# Patient Record
Sex: FEMALE | Race: WHITE | NOT HISPANIC OR LATINO | Employment: UNEMPLOYED | URBAN - NONMETROPOLITAN AREA
[De-identification: names, ages, dates, MRNs, and addresses within clinical notes are randomized per-mention and may not be internally consistent; named-entity substitution may affect disease eponyms.]

---

## 2019-08-28 ENCOUNTER — OFFICE VISIT (OUTPATIENT)
Dept: CARDIOLOGY | Facility: CLINIC | Age: 39
End: 2019-08-28

## 2019-08-28 VITALS
HEIGHT: 65 IN | DIASTOLIC BLOOD PRESSURE: 64 MMHG | HEART RATE: 94 BPM | SYSTOLIC BLOOD PRESSURE: 110 MMHG | BODY MASS INDEX: 37.89 KG/M2 | WEIGHT: 227.4 LBS

## 2019-08-28 DIAGNOSIS — R01.1 MURMUR, CARDIAC: ICD-10-CM

## 2019-08-28 DIAGNOSIS — R00.2 PALPITATIONS: ICD-10-CM

## 2019-08-28 DIAGNOSIS — E88.81 METABOLIC SYNDROME: ICD-10-CM

## 2019-08-28 DIAGNOSIS — E11.9 TYPE 2 DIABETES MELLITUS WITHOUT COMPLICATION, WITHOUT LONG-TERM CURRENT USE OF INSULIN (HCC): ICD-10-CM

## 2019-08-28 DIAGNOSIS — R07.89 CHEST PAIN, ATYPICAL: ICD-10-CM

## 2019-08-28 DIAGNOSIS — R00.0 TACHYCARDIA: Primary | ICD-10-CM

## 2019-08-28 DIAGNOSIS — E05.90 HYPERTHYROIDISM: ICD-10-CM

## 2019-08-28 PROBLEM — E88.810 METABOLIC SYNDROME: Status: ACTIVE | Noted: 2019-08-28

## 2019-08-28 PROCEDURE — 99244 OFF/OP CNSLTJ NEW/EST MOD 40: CPT | Performed by: INTERNAL MEDICINE

## 2019-08-28 PROCEDURE — 93000 ELECTROCARDIOGRAM COMPLETE: CPT | Performed by: INTERNAL MEDICINE

## 2019-08-28 RX ORDER — METFORMIN HYDROCHLORIDE 500 MG/1
500 TABLET, EXTENDED RELEASE ORAL 4 TIMES DAILY
COMMUNITY

## 2019-08-28 RX ORDER — NADOLOL 40 MG/1
40 TABLET ORAL DAILY
Qty: 30 TABLET | Refills: 5 | Status: SHIPPED | OUTPATIENT
Start: 2019-08-28 | End: 2019-11-01 | Stop reason: SDUPTHER

## 2019-08-28 RX ORDER — GLIPIZIDE 10 MG/1
10 TABLET, FILM COATED, EXTENDED RELEASE ORAL DAILY
COMMUNITY
End: 2020-11-19

## 2019-08-28 NOTE — PROGRESS NOTES
Chief Complaint   Patient presents with   • Establish Care     patient is in office today d/t tachycardia   , Says she has always had an increased heart rate. .has most recent labs with her .. will follow with Endocronologist in about a month . Follow with PCP  tomorrow 8-29-19   • Chest Pain     Has mild pain to left shoulder, which comes and goes this has been on going  .   • Shortness of Breath     Has had URI , received two rounds of antibiotics recently . Has dry cough with no relief .   • Med Refill     Had medication bottles with her today        CARDIAC COMPLAINTS  chest pressure/discomfort, dyspnea and palpitations      Subjective   Kelsey Taylor is a 39 y.o. female for her initial cardiac evaluation.  She has history of diabetes and also history of abnormal thyroid problem for many years.  In 2015 she was noted to have abnormal thyroid function tests and apparently had a ultrasound done which showed small foci in the thyroid gland and was advised to undergo T-123 scan.  For some reason it was not done.  She was seen by an endocrinologist who told her it was only borderline and nothing needs to be done.  She has been having episodes of tachycardia for a long time but in the last few months is been getting worse.  Her heart rate normally runs between 9210 for the last 10 years but recently when she had a sinus problem it apparently has gone up to 150.  She also started noticing chest pain in the form of sharp pain in the left part of the chest near the left shoulder which comes and goes anytime of the day lasting for few minutes and subsides spontaneously.  She also has been having some shortness of breath apparently she was diagnosed with a URI and had 2 course of antibiotic but she still continues to have it she still has some dry cough with no relief.  She did undergo lab work recently and found the TSH was low at 0.135 and T4 level was elevated at 13.7.  She used to be on a low-dose of beta-blockers in  the form of metoprolol 25 mg twice daily but apparently the heart rate is still persisting [place].  She is not a smoker she drinks occasional alcohol.  Her father was diagnosed with ischemic heart disease, heart failure as well as atrial fibrillation.  She is not sure whether her father had thyroid problem.      No past surgical history on file.    Current Outpatient Medications   Medication Sig Dispense Refill   • glipiZIDE (GLUCOTROL XL) 10 MG 24 hr tablet Take 10 mg by mouth Daily. Takes 2 tablets daily     • metFORMIN ER (GLUCOPHAGE-XR) 500 MG 24 hr tablet Take 500 mg by mouth 4 (Four) Times a Day.     • Semaglutide (OZEMPIC) 0.25 or 0.5 MG/DOSE solution pen-injector Inject 0.5 mg under the skin into the appropriate area as directed 1 (One) Time Per Week.     • nadolol (CORGARD) 40 MG tablet Take 1 tablet by mouth Daily. 30 tablet 5     No current facility-administered medications for this visit.            ALLERGIES:  Patient has no allergy information on record.    Past Medical History:   Diagnosis Date   • Diabetes mellitus (CMS/HCC)    • H/O tubal ligation    • History of endometrial ablation    • Hx of cholecystectomy        Social History     Tobacco Use   Smoking Status Never Smoker   Smokeless Tobacco Never Used          Family History   Problem Relation Age of Onset   • Lung cancer Mother    • Heart failure Father    • Arrhythmia Father    • Atrial fibrillation Father    • Heart attack Father    • Hyperlipidemia Father    • Hypertension Father    • Diabetes type II Father    • Diabetes type II Sister    • Cancer Paternal Grandmother        Review of Systems   Constitution: Positive for malaise/fatigue and weight loss. Negative for decreased appetite.   HENT: Negative for congestion and sore throat.    Eyes: Negative for blurred vision.   Cardiovascular: Positive for chest pain, dyspnea on exertion and palpitations.   Respiratory: Positive for shortness of breath. Negative for snoring.    Endocrine:  "Negative for cold intolerance and heat intolerance.   Hematologic/Lymphatic: Negative for adenopathy. Does not bruise/bleed easily.   Skin: Negative for itching, nail changes and skin cancer.   Musculoskeletal: Negative for arthritis and myalgias.   Gastrointestinal: Negative for abdominal pain, dysphagia and heartburn.   Genitourinary: Negative for bladder incontinence and frequency.   Neurological: Negative for dizziness, light-headedness, seizures and vertigo.   Psychiatric/Behavioral: Negative for altered mental status.   Allergic/Immunologic: Negative for environmental allergies and hives.       Diabetes- Yes  Thyroid- abnormal    Objective     /64 (BP Location: Left arm)   Pulse 94   Ht 165.1 cm (65\")   Wt 103 kg (227 lb 6.4 oz)   BMI 37.84 kg/m²     Physical Exam   Constitutional: She is oriented to person, place, and time. She appears well-developed and well-nourished.   HENT:   Head: Normocephalic.   Nose: Nose normal.   Eyes: EOM are normal. Pupils are equal, round, and reactive to light.   Neck: Normal range of motion. Neck supple.   Cardiovascular: Normal rate, regular rhythm, S1 normal and S2 normal.   Murmur heard.  Pulmonary/Chest: Breath sounds normal.   Abdominal: Soft. Bowel sounds are normal.   Musculoskeletal: Normal range of motion. She exhibits no edema.   Neurological: She is alert and oriented to person, place, and time.   Skin: Skin is warm and dry.   Psychiatric: She has a normal mood and affect.         ECG 12 Lead  Date/Time: 8/28/2019 3:37 PM  Performed by: Brenda Hutton MD  Authorized by: Brenda Hutton MD   Previous ECG: no previous ECG available  Rhythm: sinus rhythm  Rate: normal  QRS axis: normal  Other findings: non-specific ST-T wave changes    Clinical impression: non-specific ECG              Assessment/Plan   Patient's Body mass index is 37.84 kg/m². BMI is above normal parameters. Recommendations include: educational material, exercise counseling and " nutrition counseling.     Kelsey was seen today for establish care, chest pain, shortness of breath and med refill.    Diagnoses and all orders for this visit:    Tachycardia  -     nadolol (CORGARD) 40 MG tablet; Take 1 tablet by mouth Daily.  -     Stress Test With Myocardial Perfusion One Day; Future    Hyperthyroidism    Type 2 diabetes mellitus without complication, without long-term current use of insulin (CMS/[facility])  -     Lipid Panel; Future    Palpitations  -     nadolol (CORGARD) 40 MG tablet; Take 1 tablet by mouth Daily.    Metabolic syndrome  -     Adult Transthoracic Echo Complete W/ Cont if Necessary Per Protocol; Future  -     Lipid Panel; Future    Chest pain, atypical  -     Stress Test With Myocardial Perfusion One Day; Future  -     Lipid Panel; Future  -     High Sensitivity CRP; Future    Murmur, cardiac  -     Adult Transthoracic Echo Complete W/ Cont if Necessary Per Protocol; Future    At baseline her heart rate is upper limit of normal.  Her blood pressure is stable.  Her EKG showed sinus rhythm with diffuse nonspecific ST-T changes.  Her clinical examination reveals a BMI of 38.  She does have slightly loud second heart sound and a short systolic murmur at the mitral area.  She does have trace pedal edema.  I had a long talk with her about the abnormal thyroid function test.  At this time am changing the Lopressor to Corgard.  She will be able to take the medication more regularly.  Apparently she does misses her evening dose of Lopressor multiple times.  I also advised her to undergo lab work to check her lipids, high CRP level.  I scheduled her to undergo an echocardiogram to evaluate the LV function, valvular structures and also to rule out any pericardial effusion.  I also schedule her to undergo a stress test to evaluate her functional status, chronotropic response and also look for any stress-induced ischemia or arrhythmia.  If the cardiac evaluation is completely normal then she may  need to undergo a few more investigation for her thyroid including the nuclear scan.  Based on the results of the test and the response to her medication, further recommendations will be made.               Electronically signed by Brenda Hutton MD August 28, 2019 3:31 PM

## 2019-08-28 NOTE — PATIENT INSTRUCTIONS
Mediterranean Diet  A Mediterranean diet refers to food and lifestyle choices that are based on the traditions of countries located on the [place]. This way of eating has been shown to help prevent certain conditions and improve outcomes for people who have chronic diseases, like kidney disease and heart disease.  What are tips for following this plan?  Lifestyle  · Cook and eat meals together with your family, when possible.  · Drink enough fluid to keep your urine clear or pale yellow.  · Be physically active every day. This includes:  ? Aerobic exercise like running or swimming.  ? Leisure activities like gardening, walking, or housework.  · Get 7-8 hours of sleep each night.  · If recommended by your health care provider, drink red wine in moderation. This means 1 glass a day for nonpregnant women and 2 glasses a day for men. A glass of wine equals 5 oz (150 mL).  Reading food labels    · Check the serving size of packaged foods. For foods such as rice and pasta, the serving size refers to the amount of cooked product, not dry.  · Check the total fat in packaged foods. Avoid foods that have saturated fat or trans fats.  · Check the ingredients list for added sugars, such as corn syrup.  Shopping  · At the grocery store, buy most of your food from the areas near the walls of the store. This includes:  ? Fresh fruits and vegetables (produce).  ? Grains, beans, nuts, and seeds. Some of these may be available in unpackaged forms or large amounts (in bulk).  ? Fresh seafood.  ? Poultry and eggs.  ? Low-fat dairy products.  · Buy whole ingredients instead of prepackaged foods.  · Buy fresh fruits and vegetables in-season from local farmers markets.  · Buy frozen fruits and vegetables in resealable bags.  · If you do not have access to quality fresh seafood, buy precooked frozen shrimp or canned fish, such as tuna, salmon, or sardines.  · Buy small amounts of raw or cooked vegetables, salads, or olives from  the deli or salad bar at your store.  · Stock your pantry so you always have certain foods on hand, such as olive oil, canned tuna, canned tomatoes, rice, pasta, and beans.  Cooking  · Cook foods with extra-virgin olive oil instead of using butter or other vegetable oils.  · Have meat as a side dish, and have vegetables or grains as your main dish. This means having meat in small portions or adding small amounts of meat to foods like pasta or stew.  · Use beans or vegetables instead of meat in common dishes like chili or lasagna.  · [place] with different cooking methods. Try roasting or broiling vegetables instead of steaming or sautéeing them.  · Add frozen vegetables to soups, stews, pasta, or rice.  · Add nuts or seeds for added healthy fat at each meal. You can add these to yogurt, salads, or vegetable dishes.  · Marinate fish or vegetables using olive oil, lemon juice, garlic, and fresh herbs.  Meal planning    · Plan to eat 1 vegetarian meal one day each week. Try to work up to 2 vegetarian meals, if possible.  · Eat seafood 2 or more times a week.  · Have healthy snacks readily available, such as:  ? Vegetable sticks with hummus.  ? Greek yogurt.  ? Fruit and nut trail mix.  · Eat balanced meals throughout the week. This includes:  ? Fruit: 2-3 servings a day  ? Vegetables: 4-5 servings a day  ? Low-fat dairy: 2 servings a day  ? Fish, poultry, or lean meat: 1 serving a day  ? Beans and legumes: 2 or more servings a week  ? Nuts and seeds: 1-2 servings a day  ? Whole grains: 6-8 servings a day  ? Extra-virgin olive oil: 3-4 servings a day  · Limit red meat and sweets to only a few servings a month  What are my food choices?  · Mediterranean diet  ? Recommended  ? Grains: Whole-grain pasta. Brown rice. Bulgar wheat. Polenta. Couscous. Whole-wheat bread. Oatmeal. Quinoa.  ? Vegetables: Artichokes. Beets. Broccoli. Cabbage. Carrots. Eggplant. Green beans. Chard. Kale. Spinach. Onions. Leeks. Peas. Squash.  Tomatoes. Peppers. Radishes.  ? Fruits: Apples. Apricots. Avocado. Berries. Bananas. Cherries. Dates. Figs. Grapes. [place]. Melon. Oranges. Peaches. Plums. Pomegranate.  ? Meats and other protein foods: Beans. Almonds. Sunflower seeds. Pine nuts. Peanuts. Cod. [place]. Scallops. Shrimp. Tuna. Tilapia. Clams. Oysters. Eggs.  ? Dairy: Low-fat milk. Cheese. Greek yogurt.  ? Beverages: Water. Red wine. Herbal tea.  ? Fats and oils: Extra virgin olive oil. Avocado oil. Grape seed oil.  ? Sweets and desserts: Greek yogurt with honey. Baked apples. Poached pears. Trail mix.  ? Seasoning and other foods: Basil. Cilantro. Coriander. Cumin. Mint. Parsley. [place]. Rosemary. Tarragon. Garlic. Oregano. Thyme. Pepper. Balsalmic vinegar. Tahini. Hummus. Tomato sauce. Olives. Mushrooms.  ? Limit these  ? Grains: Prepackaged pasta or rice dishes. Prepackaged cereal with added sugar.  ? Vegetables: Deep fried potatoes (french fries).  ? Fruits: Fruit canned in syrup.  ? Meats and other protein foods: Beef. Pork. Lamb. Poultry with skin. Hot dogs. [place].  ? Dairy: Ice cream. Sour cream. Whole milk.  ? Beverages: Juice. Sugar-sweetened soft drinks. Beer. Liquor and spirits.  ? Fats and oils: Butter. Canola oil. Vegetable oil. Beef fat (tallow). Lard.  ? Sweets and desserts: Cookies. Cakes. Pies. Candy.  ? Seasoning and other foods: Mayonnaise. Premade sauces and marinades.  ? The items listed may not be a complete list. Talk with your dietitian about what dietary choices are right for you.  Summary  · The Mediterranean diet includes both food and lifestyle choices.  · Eat a variety of fresh fruits and vegetables, beans, nuts, seeds, and whole grains.  · Limit the amount of red meat and sweets that you eat.  · Talk with your health care provider about whether it is safe for you to drink red wine in moderation. This means 1 glass a day for nonpregnant women and 2 glasses a day for men. A glass of wine equals 5 oz (150 mL).  This information  is not intended to replace advice given to you by your health care provider. Make sure you discuss any questions you have with your health care provider.  Document Released: 08/10/2017 Document Revised: 09/12/2017 Document Reviewed: 08/10/2017  [employer] Interactive Patient Education © 2019 [employer]

## 2019-09-11 ENCOUNTER — HOSPITAL ENCOUNTER (OUTPATIENT)
Dept: CARDIOLOGY | Facility: HOSPITAL | Age: 39
Discharge: HOME | End: 2019-09-11

## 2019-09-11 ENCOUNTER — LAB (OUTPATIENT)
Dept: LAB | Facility: HOSPITAL | Age: 39
End: 2019-09-11

## 2019-09-11 VITALS — WEIGHT: 227.07 LBS | BODY MASS INDEX: 37.83 KG/M2 | HEIGHT: 65 IN

## 2019-09-11 DIAGNOSIS — R01.1 MURMUR, CARDIAC: ICD-10-CM

## 2019-09-11 DIAGNOSIS — E88.81 METABOLIC SYNDROME: ICD-10-CM

## 2019-09-11 DIAGNOSIS — E11.9 TYPE 2 DIABETES MELLITUS WITHOUT COMPLICATION, WITHOUT LONG-TERM CURRENT USE OF INSULIN (HCC): ICD-10-CM

## 2019-09-11 DIAGNOSIS — R07.89 CHEST PAIN, ATYPICAL: ICD-10-CM

## 2019-09-11 DIAGNOSIS — R00.0 TACHYCARDIA: ICD-10-CM

## 2019-09-11 LAB
BH CV ECHO MEAS - ACS: 2.2 CM
BH CV ECHO MEAS - AO MAX PG (FULL): 0.63 MMHG
BH CV ECHO MEAS - AO MAX PG: 4 MMHG
BH CV ECHO MEAS - AO MEAN PG (FULL): 0.18 MMHG
BH CV ECHO MEAS - AO MEAN PG: 2 MMHG
BH CV ECHO MEAS - AO ROOT AREA (BSA CORRECTED): 1.3
BH CV ECHO MEAS - AO ROOT AREA: 6.1 CM^2
BH CV ECHO MEAS - AO ROOT DIAM: 2.8 CM
BH CV ECHO MEAS - AO V2 MAX: 99.9 CM/SEC
BH CV ECHO MEAS - AO V2 MEAN: 65.1 CM/SEC
BH CV ECHO MEAS - AO V2 VTI: 19.8 CM
BH CV ECHO MEAS - BSA(HAYCOCK): 2.2 M^2
BH CV ECHO MEAS - BSA: 2.1 M^2
BH CV ECHO MEAS - BZI_BMI: 37.8 KILOGRAMS/M^2
BH CV ECHO MEAS - BZI_METRIC_HEIGHT: 165.1 CM
BH CV ECHO MEAS - BZI_METRIC_WEIGHT: 103 KG
BH CV ECHO MEAS - CI(CUBED): 4.8 L/MIN/M^2
BH CV ECHO MEAS - CI(TEICH): 4.7 L/MIN/M^2
BH CV ECHO MEAS - CO(CUBED): 10 L/MIN
BH CV ECHO MEAS - CO(TEICH): 9.8 L/MIN
BH CV ECHO MEAS - EDV(CUBED): 68 ML
BH CV ECHO MEAS - EDV(TEICH): 73.4 ML
BH CV ECHO MEAS - EF(CUBED): 77.4 %
BH CV ECHO MEAS - EF(TEICH): 70 %
BH CV ECHO MEAS - ESV(CUBED): 15.4 ML
BH CV ECHO MEAS - ESV(TEICH): 22 ML
BH CV ECHO MEAS - FS: 39.1 %
BH CV ECHO MEAS - IVS/LVPW: 0.97
BH CV ECHO MEAS - IVSD: 0.9 CM
BH CV ECHO MEAS - LA DIMENSION(2D): 3.6 CM
BH CV ECHO MEAS - LA DIMENSION: 4.1 CM
BH CV ECHO MEAS - LA/AO: 1.5
BH CV ECHO MEAS - LAT PEAK E' VEL: 10 CM/SEC
BH CV ECHO MEAS - LV IVRT: 0.1 SEC
BH CV ECHO MEAS - LV MASS(C)D: 116.7 GRAMS
BH CV ECHO MEAS - LV MASS(C)DI: 55.9 GRAMS/M^2
BH CV ECHO MEAS - LV MAX PG: 3.4 MMHG
BH CV ECHO MEAS - LV MEAN PG: 1.8 MMHG
BH CV ECHO MEAS - LV V1 MAX: 91.7 CM/SEC
BH CV ECHO MEAS - LV V1 MEAN: 63.6 CM/SEC
BH CV ECHO MEAS - LV V1 VTI: 20 CM
BH CV ECHO MEAS - LVIDD: 4.1 CM
BH CV ECHO MEAS - LVIDS: 2.5 CM
BH CV ECHO MEAS - LVPWD: 0.94 CM
BH CV ECHO MEAS - MED PEAK E' VEL: 8 CM/SEC
BH CV ECHO MEAS - MITRAL HR: 175.2 BPM
BH CV ECHO MEAS - MITRAL R-R: 0.34 SEC
BH CV ECHO MEAS - MM HR: 190.5 BPM
BH CV ECHO MEAS - MM R-R INT: 0.31 SEC
BH CV ECHO MEAS - MV A MAX VEL: 80.3 CM/SEC
BH CV ECHO MEAS - MV DEC SLOPE: 537 CM/SEC^2
BH CV ECHO MEAS - MV DEC TIME: 0.13 SEC
BH CV ECHO MEAS - MV E MAX VEL: 70.7 CM/SEC
BH CV ECHO MEAS - MV E/A: 0.88
BH CV ECHO MEAS - MV MAX PG: 3.9 MMHG
BH CV ECHO MEAS - MV MEAN PG: 1.9 MMHG
BH CV ECHO MEAS - MV V2 MAX: 98.5 CM/SEC
BH CV ECHO MEAS - MV V2 MEAN: 64.7 CM/SEC
BH CV ECHO MEAS - MV V2 VTI: 22.2 CM
BH CV ECHO MEAS - PA MAX PG (FULL): 3.3 MMHG
BH CV ECHO MEAS - PA MAX PG: 7.6 MMHG
BH CV ECHO MEAS - PA MEAN PG (FULL): 1.4 MMHG
BH CV ECHO MEAS - PA MEAN PG: 3.6 MMHG
BH CV ECHO MEAS - PA V2 MAX: 137.6 CM/SEC
BH CV ECHO MEAS - PA V2 MEAN: 87.1 CM/SEC
BH CV ECHO MEAS - PA V2 VTI: 31.4 CM
BH CV ECHO MEAS - PULM. HR: 166.7 BPM
BH CV ECHO MEAS - PULM. R-R: 0.36 SEC
BH CV ECHO MEAS - RAP SYSTOLE: 10 MMHG
BH CV ECHO MEAS - RV MAX PG: 4.3 MMHG
BH CV ECHO MEAS - RV MEAN PG: 2.2 MMHG
BH CV ECHO MEAS - RV V1 MAX: 103.8 CM/SEC
BH CV ECHO MEAS - RV V1 MEAN: 69.6 CM/SEC
BH CV ECHO MEAS - RV V1 VTI: 24.8 CM
BH CV ECHO MEAS - RVDD: 2.9 CM
BH CV ECHO MEAS - RVSP: 21 MMHG
BH CV ECHO MEAS - SI(AO): 58.1 ML/M^2
BH CV ECHO MEAS - SI(CUBED): 25.2 ML/M^2
BH CV ECHO MEAS - SI(TEICH): 24.6 ML/M^2
BH CV ECHO MEAS - SV(AO): 121.2 ML
BH CV ECHO MEAS - SV(CUBED): 52.6 ML
BH CV ECHO MEAS - SV(TEICH): 51.4 ML
BH CV ECHO MEAS - TR MAX VEL: 164.2 CM/SEC
BH CV ECHO MEASUREMENTS AVERAGE E/E' RATIO: 7.86
BH CV STRESS RECOVERY BP: NORMAL MMHG
BH CV STRESS RECOVERY HR: 97 BPM
CHOLEST SERPL-MCNC: 157 MG/DL (ref 0–200)
HDLC SERPL-MCNC: 52 MG/DL (ref 40–60)
LDLC SERPL CALC-MCNC: 82 MG/DL (ref 0–100)
LDLC/HDLC SERPL: 1.58 {RATIO}
LV EF NUC BP: 57 %
MAXIMAL PREDICTED HEART RATE: 180 BPM
MAXIMAL PREDICTED HEART RATE: 180 BPM
PERCENT MAX PREDICTED HR: 86.11 %
STRESS BASELINE BP: NORMAL MMHG
STRESS BASELINE HR: 83 BPM
STRESS PERCENT HR: 101 %
STRESS POST ESTIMATED WORKLOAD: 8.8 METS
STRESS POST EXERCISE DUR MIN: 7 MIN
STRESS POST EXERCISE DUR SEC: 10 SEC
STRESS POST PEAK BP: NORMAL MMHG
STRESS POST PEAK HR: 155 BPM
STRESS TARGET HR: 153 BPM
STRESS TARGET HR: 153 BPM
TRIGL SERPL-MCNC: 113 MG/DL (ref 0–150)
VLDLC SERPL-MCNC: 22.6 MG/DL

## 2019-09-11 PROCEDURE — 93018 CV STRESS TEST I&R ONLY: CPT | Performed by: INTERNAL MEDICINE

## 2019-09-11 PROCEDURE — 36415 COLL VENOUS BLD VENIPUNCTURE: CPT

## 2019-09-11 PROCEDURE — 0 TECHNETIUM SESTAMIBI: Performed by: INTERNAL MEDICINE

## 2019-09-11 PROCEDURE — A9500 TC99M SESTAMIBI: HCPCS | Performed by: INTERNAL MEDICINE

## 2019-09-11 PROCEDURE — 93017 CV STRESS TEST TRACING ONLY: CPT

## 2019-09-11 PROCEDURE — 93306 TTE W/DOPPLER COMPLETE: CPT | Performed by: INTERNAL MEDICINE

## 2019-09-11 PROCEDURE — 78452 HT MUSCLE IMAGE SPECT MULT: CPT

## 2019-09-11 PROCEDURE — 78452 HT MUSCLE IMAGE SPECT MULT: CPT | Performed by: INTERNAL MEDICINE

## 2019-09-11 PROCEDURE — 80061 LIPID PANEL: CPT | Performed by: INTERNAL MEDICINE

## 2019-09-11 PROCEDURE — 93306 TTE W/DOPPLER COMPLETE: CPT

## 2019-09-11 PROCEDURE — 86141 C-REACTIVE PROTEIN HS: CPT | Performed by: INTERNAL MEDICINE

## 2019-09-11 RX ADMIN — TECHNETIUM TC 99M SESTAMIBI 1 DOSE: 1 INJECTION INTRAVENOUS at 09:00

## 2019-09-11 RX ADMIN — TECHNETIUM TC 99M SESTAMIBI 1 DOSE: 1 INJECTION INTRAVENOUS at 09:01

## 2019-09-12 LAB — CRP SERPL-MCNC: 1.36 MG/DL (ref 0.01–0.5)

## 2019-10-07 ENCOUNTER — TELEPHONE (OUTPATIENT)
Dept: CARDIOLOGY | Facility: CLINIC | Age: 39
End: 2019-10-07

## 2019-10-07 NOTE — TELEPHONE ENCOUNTER
Received fax from Dr. Chilel for cardiac clearance for patient to have an EGD and a colonoscopy. According to our records, I do not see where patient has had any stenting or is on any blood thinners.       Fax [phone]

## 2019-11-01 ENCOUNTER — TELEPHONE (OUTPATIENT)
Dept: CARDIOLOGY | Facility: CLINIC | Age: 39
End: 2019-11-01

## 2019-11-01 DIAGNOSIS — R00.0 TACHYCARDIA: ICD-10-CM

## 2019-11-01 DIAGNOSIS — R00.2 PALPITATIONS: ICD-10-CM

## 2019-11-01 RX ORDER — NADOLOL 40 MG/1
40 TABLET ORAL DAILY
Qty: 30 TABLET | Refills: 5 | Status: SHIPPED | OUTPATIENT
Start: 2019-11-01 | End: 2020-03-27

## 2019-11-01 NOTE — TELEPHONE ENCOUNTER
Pt called asking for refills on nadolol 40 mg daily,#30.Sent to [facility]. art had not transferred the script.

## 2019-11-21 ENCOUNTER — OFFICE VISIT (OUTPATIENT)
Dept: CARDIOLOGY | Facility: CLINIC | Age: 39
End: 2019-11-21

## 2019-11-21 VITALS
WEIGHT: 237.4 LBS | DIASTOLIC BLOOD PRESSURE: 70 MMHG | HEART RATE: 76 BPM | SYSTOLIC BLOOD PRESSURE: 130 MMHG | HEIGHT: 65 IN | BODY MASS INDEX: 39.55 KG/M2

## 2019-11-21 DIAGNOSIS — R01.1 CARDIAC MURMUR: ICD-10-CM

## 2019-11-21 DIAGNOSIS — E05.00 GRAVES DISEASE: ICD-10-CM

## 2019-11-21 DIAGNOSIS — E11.9 TYPE 2 DIABETES MELLITUS WITHOUT COMPLICATION, WITHOUT LONG-TERM CURRENT USE OF INSULIN (HCC): ICD-10-CM

## 2019-11-21 DIAGNOSIS — E88.81 METABOLIC SYNDROME: Primary | ICD-10-CM

## 2019-11-21 DIAGNOSIS — R00.2 PALPITATIONS: ICD-10-CM

## 2019-11-21 DIAGNOSIS — I34.0 MITRAL VALVE INSUFFICIENCY, UNSPECIFIED ETIOLOGY: ICD-10-CM

## 2019-11-21 DIAGNOSIS — E66.09 CLASS 2 OBESITY DUE TO EXCESS CALORIES WITHOUT SERIOUS COMORBIDITY WITH BODY MASS INDEX (BMI) OF 39.0 TO 39.9 IN ADULT: ICD-10-CM

## 2019-11-21 DIAGNOSIS — Z79.899 MEDICATION MANAGEMENT: ICD-10-CM

## 2019-11-21 PROCEDURE — 99213 OFFICE O/P EST LOW 20 MIN: CPT | Performed by: NURSE PRACTITIONER

## 2019-11-21 RX ORDER — METHIMAZOLE 10 MG/1
10 TABLET ORAL DAILY
COMMUNITY
End: 2020-11-19

## 2020-03-27 DIAGNOSIS — R00.2 PALPITATIONS: ICD-10-CM

## 2020-03-27 DIAGNOSIS — R00.0 TACHYCARDIA: ICD-10-CM

## 2020-03-27 RX ORDER — NADOLOL 40 MG/1
TABLET ORAL
Qty: 30 TABLET | Refills: 8 | Status: SHIPPED | OUTPATIENT
Start: 2020-03-27 | End: 2020-11-19 | Stop reason: SDUPTHER

## 2020-11-19 ENCOUNTER — OFFICE VISIT (OUTPATIENT)
Dept: CARDIOLOGY | Facility: CLINIC | Age: 40
End: 2020-11-19

## 2020-11-19 VITALS
WEIGHT: 236.2 LBS | DIASTOLIC BLOOD PRESSURE: 80 MMHG | BODY MASS INDEX: 39.35 KG/M2 | SYSTOLIC BLOOD PRESSURE: 120 MMHG | HEIGHT: 65 IN | TEMPERATURE: 97.4 F | HEART RATE: 84 BPM

## 2020-11-19 DIAGNOSIS — R00.2 PALPITATIONS: ICD-10-CM

## 2020-11-19 DIAGNOSIS — R00.0 TACHYCARDIA: ICD-10-CM

## 2020-11-19 PROCEDURE — 99213 OFFICE O/P EST LOW 20 MIN: CPT | Performed by: NURSE PRACTITIONER

## 2020-11-19 RX ORDER — NADOLOL 40 MG/1
40 TABLET ORAL DAILY
Qty: 90 TABLET | Refills: 4 | Status: SHIPPED | OUTPATIENT
Start: 2020-11-19 | End: 2021-11-18 | Stop reason: SDUPTHER

## 2020-11-19 NOTE — PROGRESS NOTES
Chief Complaint   Patient presents with   • Follow-up     Cardiac management. Has been Dx with Graves disease.   • Lab     Last labs July or August per Ann Castillo.   • Dizziness     Feels related to low B/P at times.   • Med Refill     Needs refills on Nadolol-90 day.     Subjective       Kelsey Taylor is a 40 y.o. female with diabetes who was referred in August 2019 for cardiac evaluation of tachycardia and chest pain. In 2015 she was found to have abnormal thyroid function which was followed by endocrinology until she was diagnosed with Graves disease in 2019 managed with Tapazole. Because of the cardiac symptoms, she underwent stress test and echocardiogram which showed mild hypertensive response, normal chronotropic response and no ischemia. Echo revealed normal LVEF with mild MR. Lopressor was changed to nadolol and symptoms improved.     She returns today for follow up. She is tolerating nadolol with improvement of tachycardia and chest discomfort. She does occasionally have light-headedness with lower blood pressure. No syncope. She follows with endocrinology in [facility] who manages diabetes and hyperthyroidism. Thyroid is now stable according to her, but A1C remains difficult to control, around 9%.        Cardiac History:    Past Surgical History:   Procedure Laterality Date   • CARDIOVASCULAR STRESS TEST  09/11/2019    7 Min, 10 Secs.8.80 METS. 86% THR. BP- 150/104. EF 57%. Negative.   • ECHO - CONVERTED  09/11/2019    EF 65%. LA- 4.1 Cm. Mild MR. RVSP- 21 mmHg.     Current Outpatient Medications   Medication Sig Dispense Refill   • Liraglutide (VICTOZA SC) Inject  under the skin into the appropriate area as directed Daily.     • metFORMIN ER (GLUCOPHAGE-XR) 500 MG 24 hr tablet Take 500 mg by mouth 4 (Four) Times a Day.     • nadolol (CORGARD) 40 MG tablet Take 1 tablet by mouth Daily. 90 tablet 4     No current facility-administered medications for this visit.      Patient has no known  allergies.    Past Medical History:   Diagnosis Date   • Diabetes mellitus (CMS/HCC)    • Graves disease    • H/O tubal ligation    • History of endometrial ablation    • Hx of cholecystectomy      Social History     Socioeconomic History   • Marital status:      Spouse name: Not on file   • Number of children: Not on file   • Years of education: Not on file   • Highest education level: Not on file   Tobacco Use   • Smoking status: Never Smoker   • Smokeless tobacco: Never Used   Substance and Sexual Activity   • Alcohol use: Yes     Alcohol/week: 1.0 standard drinks     Types: 1 Glasses of wine per week     Comment: occasionally    • Drug use: No   • Sexual activity: Defer     Family History   Problem Relation Age of Onset   • Lung cancer Mother    • Heart failure Father    • Arrhythmia Father    • Atrial fibrillation Father    • Heart attack Father    • Hyperlipidemia Father    • Hypertension Father    • Diabetes type II Father    • Diabetes type II Sister    • Cancer Paternal Grandmother      Review of Systems   Constitution: Positive for weight loss (down 1 lb). Negative for decreased appetite and malaise/fatigue.   HENT: Negative.    Eyes: Negative for blurred vision.   Cardiovascular: Negative for chest pain, dyspnea on exertion, leg swelling, palpitations (tachycardia improved ) and syncope.   Respiratory: Negative for shortness of breath and sleep disturbances due to breathing.    Endocrine: Negative.    Hematologic/Lymphatic: Negative for bleeding problem. Does not bruise/bleed easily.   Skin: Negative.    Musculoskeletal: Negative for falls and myalgias.   Gastrointestinal: Negative for abdominal pain, heartburn and melena.   Genitourinary: Negative for hematuria.   Neurological: Positive for light-headedness. Negative for dizziness.   Psychiatric/Behavioral: Negative for altered mental status.   Allergic/Immunologic: Negative.       Objective     /80 (BP Location: Left arm)   Pulse 84    "Temp 97.4 °F (36.3 °C)   Ht 165 cm (64.96\")   Wt 107 kg (236 lb 3.2 oz)   BMI 39.35 kg/m²     Vitals signs and nursing note reviewed.   Constitutional:       General: Not in acute distress.     Appearance: Well-developed. Not diaphoretic.   Eyes:      Pupils: Pupils are equal, round, and reactive to light.   HENT:      Head: Normocephalic.   Neck:      Musculoskeletal: Normal range of motion.   Pulmonary:      Effort: Pulmonary effort is normal. No respiratory distress.      Breath sounds: Normal breath sounds.   Cardiovascular:      Normal rate. Regular rhythm.   Pulses:     Intact distal pulses.   Abdominal:      General: Bowel sounds are normal.      Palpations: Abdomen is soft.   Musculoskeletal: Normal range of motion.   Skin:     General: Skin is warm and dry.   Neurological:      Mental Status: Alert and oriented to person, place, and time.        Procedures          Problem List Items Addressed This Visit        Cardiovascular and Mediastinum    Tachycardia    Relevant Medications    nadolol (CORGARD) 40 MG tablet    Palpitations    Relevant Medications    nadolol (CORGARD) 40 MG tablet         1. Chest pain- improved with beta blocker. Nuclear stress findings reviewed with her showing no ischemia, normal LV function. No structural heart disease noted.     2. Palpitations/tachycardia- heart rate stable at 84, no ectopic beats. Continue beta blocker. Refills sent. She limits caffeine to one cup coffee daily followed by decaffeinated.     3. Light-headedness- BP normal today. Encouraged increased fluid intake. Electrolyte drink if she becomes dizzy.     4. Diabetes- A1C remains elevated, followed by Endo. Limit carbohydrates, increase protein intake.     5. Graves- euthyroidic, per pt. Follows with endocrine. Off Tapazole.     She appears stable from a cardiac standpoint. No changes made. We will see her back in one year or sooner if needed.     Patient's Body mass index is 39.35 kg/m². BMI is above normal " parameters. Recommendations include: nutrition counseling.               Electronically signed by RAAD Oneal,  November 19, 2020 10:07 EST

## 2021-11-18 ENCOUNTER — OFFICE VISIT (OUTPATIENT)
Dept: CARDIOLOGY | Facility: CLINIC | Age: 41
End: 2021-11-18

## 2021-11-18 VITALS
DIASTOLIC BLOOD PRESSURE: 80 MMHG | HEIGHT: 65 IN | TEMPERATURE: 97.5 F | BODY MASS INDEX: 40.89 KG/M2 | SYSTOLIC BLOOD PRESSURE: 110 MMHG | WEIGHT: 245.4 LBS | HEART RATE: 72 BPM

## 2021-11-18 DIAGNOSIS — E11.9 TYPE 2 DIABETES MELLITUS WITHOUT COMPLICATION, WITHOUT LONG-TERM CURRENT USE OF INSULIN (HCC): ICD-10-CM

## 2021-11-18 DIAGNOSIS — R00.2 PALPITATIONS: ICD-10-CM

## 2021-11-18 DIAGNOSIS — E05.90 HYPERTHYROIDISM: ICD-10-CM

## 2021-11-18 DIAGNOSIS — R00.0 TACHYCARDIA: ICD-10-CM

## 2021-11-18 DIAGNOSIS — E88.81 METABOLIC SYNDROME: Primary | ICD-10-CM

## 2021-11-18 PROCEDURE — 99213 OFFICE O/P EST LOW 20 MIN: CPT | Performed by: NURSE PRACTITIONER

## 2021-11-18 RX ORDER — SEMAGLUTIDE 1.34 MG/ML
0.5 INJECTION, SOLUTION SUBCUTANEOUS WEEKLY
COMMUNITY
End: 2022-11-17

## 2021-11-18 RX ORDER — NADOLOL 40 MG/1
40 TABLET ORAL DAILY
Qty: 90 TABLET | Refills: 4 | Status: SHIPPED | OUTPATIENT
Start: 2021-11-18 | End: 2022-11-17 | Stop reason: SDUPTHER

## 2021-11-18 NOTE — PROGRESS NOTES
Chief Complaint   Patient presents with   • Follow-up     Cardiac management   • Lab     Last labs 21/2 months ago with Ann Castillo.   • Rapid Heart Rate     Doing better with use of Nadolol. Only notices occasional episodes, not often.   • Med Refill     Needs refills on Nadolol-90 day.     Subjective       Kelsey Taylor is a 41 y.o. female with diabetes who was referred in August 2019 for cardiac evaluation of tachycardia and chest pain. In 2015 she was found to have abnormal thyroid function which was followed by endocrinology until she was diagnosed with Graves disease in 2019 managed with Tapazole. Because of the cardiac symptoms, she underwent stress test and echocardiogram which showed mild hypertensive response, normal chronotropic response and no ischemia. Echo revealed normal LVEF with mild MR. Lopressor was changed to nadolol and symptoms improved.     She returns today for follow up. Symptoms remain improved with nadolol. She is back on Ozempic. Blood sugars improved. A1C down from 9% to 8%. Continues to follow with endocrinology in [facility] who manages diabetes and hyperthyroidism. Lipids normal in 2019. She did not tolerate statin in the past. She is caring for 13 year old adopted daughter and now two infant grandchildren living with her.         Cardiac History:    Past Surgical History:   Procedure Laterality Date   • CARDIOVASCULAR STRESS TEST  09/11/2019    7 Min, 10 Secs.8.80 METS. 86% THR. BP- 150/104. EF 57%. Negative.   • ECHO - CONVERTED  09/11/2019    EF 65%. LA- 4.1 Cm. Mild MR. RVSP- 21 mmHg.     Current Outpatient Medications   Medication Sig Dispense Refill   • Insulin Degludec (TRESIBA FLEXTOUCH SC) Inject 64 Units under the skin into the appropriate area as directed Every Night.     • insulin lispro (humaLOG) 100 UNIT/ML injection Inject  under the skin into the appropriate area as directed 3 (Three) Times a Day Before Meals.     • metFORMIN ER (GLUCOPHAGE-XR) 500 MG 24 hr tablet  Take 500 mg by mouth 4 (Four) Times a Day.     • nadolol (CORGARD) 40 MG tablet Take 1 tablet by mouth Daily. 90 tablet 4   • Semaglutide,0.25 or 0.5MG/DOS, (Ozempic, 0.25 or 0.5 MG/DOSE,) 2 MG/1.5ML solution pen-injector Inject 0.5 mg under the skin into the appropriate area as directed 1 (One) Time Per Week.       No current facility-administered medications for this visit.     Patient has no known allergies.    Past Medical History:   Diagnosis Date   • Diabetes mellitus (HCC)    • Graves disease    • H/O tubal ligation    • History of endometrial ablation    • Hx of cholecystectomy      Social History     Socioeconomic History   • Marital status:    Tobacco Use   • Smoking status: Never Smoker   • Smokeless tobacco: Never Used   Vaping Use   • Vaping Use: Never used   Substance and Sexual Activity   • Alcohol use: Not Currently     Alcohol/week: 1.0 standard drink     Types: 1 Glasses of wine per week     Comment: occasionally    • Drug use: No   • Sexual activity: Defer     Family History   Problem Relation Age of Onset   • Lung cancer Mother    • Heart failure Father    • Arrhythmia Father    • Atrial fibrillation Father    • Heart attack Father    • Hyperlipidemia Father    • Hypertension Father    • Diabetes type II Father    • Diabetes type II Sister    • Cancer Paternal Grandmother      Review of Systems   Constitutional: Negative for decreased appetite and malaise/fatigue.   HENT: Negative.    Eyes: Negative for blurred vision.   Cardiovascular: Negative for chest pain, dyspnea on exertion, leg swelling, palpitations and syncope.   Respiratory: Negative for shortness of breath and sleep disturbances due to breathing.    Endocrine: Negative.    Hematologic/Lymphatic: Negative for bleeding problem. Does not bruise/bleed easily.   Skin: Negative.    Musculoskeletal: Negative for falls and myalgias.   Gastrointestinal: Negative for abdominal pain, heartburn and melena.   Genitourinary: Negative for  "hematuria.   Neurological: Negative for dizziness and light-headedness.   Psychiatric/Behavioral: Negative for altered mental status.   Allergic/Immunologic: Negative.       Objective     /80 (BP Location: Right arm)   Pulse 72   Temp 97.5 °F (36.4 °C)   Ht 165 cm (64.96\")   Wt 111 kg (245 lb 6.4 oz)   BMI 40.89 kg/m²     Vitals and nursing note reviewed.   Constitutional:       General: Not in acute distress.     Appearance: Well-developed. Not diaphoretic.   Eyes:      Pupils: Pupils are equal, round, and reactive to light.   HENT:      Head: Normocephalic.   Pulmonary:      Effort: Pulmonary effort is normal. No respiratory distress.      Breath sounds: Normal breath sounds.   Cardiovascular:      Normal rate. Regular rhythm.   Pulses:     Intact distal pulses.   Edema:     Peripheral edema absent.   Abdominal:      General: Bowel sounds are normal.      Palpations: Abdomen is soft.   Musculoskeletal: Normal range of motion.      Cervical back: Normal range of motion. Skin:     General: Skin is warm and dry.   Neurological:      Mental Status: Alert and oriented to person, place, and time.        Procedures          Problem List Items Addressed This Visit        Cardiac and Vasculature    Tachycardia    Relevant Medications    nadolol (CORGARD) 40 MG tablet    Palpitations    Relevant Medications    nadolol (CORGARD) 40 MG tablet       Endocrine and Metabolic    Metabolic syndrome - Primary    Type 2 diabetes mellitus without complication, without long-term current use of insulin (HCC)    Relevant Medications    Semaglutide,0.25 or 0.5MG/DOS, (Ozempic, 0.25 or 0.5 MG/DOSE,) 2 MG/1.5ML solution pen-injector    Insulin Degludec (TRESIBA FLEXTOUCH SC)    insulin lispro (humaLOG) 100 UNIT/ML injection    Hyperthyroidism    Relevant Medications    nadolol (CORGARD) 40 MG tablet         1. Chest pain- improved with beta blocker. Nuclear stress findings reviewed with her showing no ischemia, normal LV " function. No structural heart disease noted.      2. Palpitations/tachycardia- improved with nadolol. Continue same plan. She limits caffeine to one cup coffee daily followed by decaffeinated.      3.  Diabetes- A1C improving but remains elevated, followed by Endo. Limit carbohydrates, increase protein intake.      5. Graves- euthyroidic, per pt. Follows with endocrine. Off Tapazole.      She appears stable from a cardiac standpoint. No changes made. We will see her back in one year or sooner if needed.     Patient's Body mass index is 40.89 kg/m². indicating that she is obese (BMI >30). Heart healthy diet, regular exercise encouraged.             Electronically signed by RAAD Oneal,  November 19, 2021 08:19 EST

## 2022-11-17 ENCOUNTER — OFFICE VISIT (OUTPATIENT)
Dept: CARDIOLOGY | Facility: CLINIC | Age: 42
End: 2022-11-17

## 2022-11-17 VITALS
WEIGHT: 248 LBS | HEART RATE: 60 BPM | SYSTOLIC BLOOD PRESSURE: 112 MMHG | DIASTOLIC BLOOD PRESSURE: 70 MMHG | BODY MASS INDEX: 42.34 KG/M2 | HEIGHT: 64 IN

## 2022-11-17 DIAGNOSIS — E04.2 MULTIPLE THYROID NODULES: Primary | ICD-10-CM

## 2022-11-17 DIAGNOSIS — E66.01 MORBID OBESITY WITH BMI OF 40.0-44.9, ADULT: ICD-10-CM

## 2022-11-17 DIAGNOSIS — R00.2 PALPITATIONS: ICD-10-CM

## 2022-11-17 DIAGNOSIS — E11.9 TYPE 2 DIABETES MELLITUS WITHOUT COMPLICATION, WITHOUT LONG-TERM CURRENT USE OF INSULIN: ICD-10-CM

## 2022-11-17 DIAGNOSIS — R00.0 TACHYCARDIA: ICD-10-CM

## 2022-11-17 PROCEDURE — 99214 OFFICE O/P EST MOD 30 MIN: CPT | Performed by: NURSE PRACTITIONER

## 2022-11-17 RX ORDER — NADOLOL 40 MG/1
40 TABLET ORAL DAILY
Qty: 90 TABLET | Refills: 4 | Status: SHIPPED | OUTPATIENT
Start: 2022-11-17

## 2022-11-17 NOTE — PROGRESS NOTES
Chief Complaint   Patient presents with   • Follow-up     Pt is here for cardiac follow up.  Pt states she was having palpitations a few months ago, but it was thyroid related.  She is having a thyroidectomy on December 1st.  She denies CP, SOB or dizziness. Pt does not take a daily ASA.     • Med Refill     Pt request 90 day refills to be sent to [facility].  Medications were verbalized by the pt.     • Lab Work     Pt states their last labs were a few months ago with her endocrinologist.         Cardiac Complaints  palpitations      Subjective   Kelsey Taylor is a 42 y.o. female with diabetes who was referred in August 2019 for cardiac evaluation of tachycardia and chest pain. In 2015 she was found to have abnormal thyroid function which was followed by endocrinology until she was diagnosed with Graves disease in 2019 managed with Tapazole. Because of the cardiac symptoms, she underwent stress test and echocardiogram which showed mild hypertensive response, normal chronotropic response and no ischemia. Echo revealed normal LVEF with mild MR. Lopressor was changed to nadolol and symptoms improved.    She comes today for follow up and admits to palpitations about a month ago. She states it was thyroid related and is scheduled for thyroidectomy on December 1st. PCP added back tapazole as GRAVES had flared up again. She admits scan revealed a thyroid nodule about 1inch on the right, will have removal with [facility]. She admits some of her lymph are enlarged as well. She denies any CP, SOA, dizziness, or palpitations. Labs followed by endocrine.        Cardiac History  Past Surgical History:   Procedure Laterality Date   • CARDIOVASCULAR STRESS TEST  09/11/2019    7 Min, 10 Secs.8.80 METS. 86% THR. BP- 150/104. EF 57%. Negative.   • ECHO - CONVERTED  09/11/2019    EF 65%. LA- 4.1 Cm. Mild MR. RVSP- 21 mmHg.       Current Outpatient Medications   Medication Sig Dispense Refill   • Insulin Degludec (TRESIBA  FLEXTOUCH SC) Inject 64 Units under the skin into the appropriate area as directed Every Night.     • insulin lispro (humaLOG) 100 UNIT/ML injection Inject  under the skin into the appropriate area as directed 3 (Three) Times a Day Before Meals.     • metFORMIN ER (GLUCOPHAGE-XR) 500 MG 24 hr tablet Take 500 mg by mouth 4 (Four) Times a Day.     • nadolol (CORGARD) 40 MG tablet Take 1 tablet by mouth Daily. 90 tablet 4     No current facility-administered medications for this visit.       Patient has no known allergies.    Past Medical History:   Diagnosis Date   • Diabetes mellitus (HCC)    • Graves disease    • H/O tubal ligation    • History of endometrial ablation    • Hx of cholecystectomy    • Thyroid nodule        Social History     Socioeconomic History   • Marital status:    Tobacco Use   • Smoking status: Never   • Smokeless tobacco: Never   Vaping Use   • Vaping Use: Never used   Substance and Sexual Activity   • Alcohol use: Not Currently     Alcohol/week: 1.0 standard drink     Types: 1 Glasses of wine per week     Comment: occasionally    • Drug use: No   • Sexual activity: Defer       Family History   Problem Relation Age of Onset   • Lung cancer Mother    • Heart failure Father    • Arrhythmia Father    • Atrial fibrillation Father    • Heart attack Father    • Hyperlipidemia Father    • Hypertension Father    • Diabetes type II Father    • Diabetes type II Sister    • Cancer Paternal Grandmother        Review of Systems   Constitutional: Negative for malaise/fatigue and night sweats.   Cardiovascular: Positive for palpitations. Negative for chest pain, claudication, dyspnea on exertion, irregular heartbeat, leg swelling, near-syncope, orthopnea and syncope.   Respiratory: Negative for cough, shortness of breath and wheezing.    Musculoskeletal: Positive for stiffness. Negative for back pain and joint pain.   Gastrointestinal: Negative for anorexia, heartburn, nausea and vomiting.  "  Genitourinary: Negative for dysuria, hematuria, hesitancy and nocturia.   Neurological: Negative for dizziness, light-headedness and loss of balance.   Psychiatric/Behavioral: Negative for depression and memory loss. The patient is not nervous/anxious.            Objective     /70 (BP Location: Left arm, Patient Position: Sitting)   Pulse 60   Ht 162.6 cm (64\")   Wt 112 kg (248 lb)   BMI 42.57 kg/m²     Constitutional:       Appearance: Not in distress.   Eyes:      Pupils: Pupils are equal, round, and reactive to light.   HENT:      Nose: Nose normal.   Pulmonary:      Effort: Pulmonary effort is normal.      Breath sounds: Normal breath sounds.   Cardiovascular:      PMI at left midclavicular line. Normal rate. Regular rhythm.      Murmurs: There is a systolic murmur.   Abdominal:      Palpations: Abdomen is soft.   Musculoskeletal: Normal range of motion.      Cervical back: Normal range of motion and neck supple. Skin:     General: Skin is warm and dry.   Neurological:      Mental Status: Alert.         Procedures         Diagnoses and all orders for this visit:    1. Multiple thyroid nodules (Primary)    2. Tachycardia  -     nadolol (CORGARD) 40 MG tablet; Take 1 tablet by mouth Daily.  Dispense: 90 tablet; Refill: 4    3. Palpitations  -     nadolol (CORGARD) 40 MG tablet; Take 1 tablet by mouth Daily.  Dispense: 90 tablet; Refill: 4    4. Type 2 diabetes mellitus without complication, without long-term current use of insulin (HCC)    5. Morbid obesity with BMI of 40.0-44.9, adult ([facility])               Palpitations: Noted a few months ago, nothing since. She admits she is to have thyroidectomy in December. Since palpitations are now denied, continue current corgard therapy.  Limited caffeine urged.     DM: Using tresiba and humalog therapy. AIC followed by endocrine.     Graves: Scheduled for thyroidectomy soon due to new nodules noted. She admits that GRAVES has flared up. Followed by " endocrine.    Cardiac status: Stable. No new concerns are voiced. No repeat workup advised.     Refills per request.    BMI noted at 42.57, good cardiac ADA diet urged.     Yearly follow up recommended.        Problems Addressed this Visit        Cardiac and Vasculature    Tachycardia    Relevant Medications    nadolol (CORGARD) 40 MG tablet    Palpitations    Relevant Medications    nadolol (CORGARD) 40 MG tablet       Endocrine and Metabolic    Type 2 diabetes mellitus without complication, without long-term current use of insulin (HCC)   Other Visit Diagnoses     Multiple thyroid nodules    -  Primary    Relevant Medications    nadolol (CORGARD) 40 MG tablet    Morbid obesity with BMI of 40.0-44.9, adult ([facility])          Diagnoses       Codes Comments    Multiple thyroid nodules    -  Primary ICD-10-CM: E04.2  ICD-9-CM: 241.1     Tachycardia     ICD-10-CM: R00.0  ICD-9-CM: 785.0     Palpitations     ICD-10-CM: R00.2  ICD-9-CM: 785.1     Type 2 diabetes mellitus without complication, without long-term current use of insulin (HCC)     ICD-10-CM: E11.9  ICD-9-CM: 250.00     Morbid obesity with BMI of 40.0-44.9, adult (HCC)     ICD-10-CM: E66.01, Z68.41  ICD-9-CM: 278.01, V85.41                     Electronically signed by Kierra De León, RAAD November 17, 2022 11:49 EST

## 2023-11-20 ENCOUNTER — OFFICE VISIT (OUTPATIENT)
Dept: CARDIOLOGY | Facility: CLINIC | Age: 43
End: 2023-11-20
Payer: COMMERCIAL

## 2023-11-20 VITALS
HEIGHT: 64 IN | BODY MASS INDEX: 39.81 KG/M2 | SYSTOLIC BLOOD PRESSURE: 130 MMHG | WEIGHT: 233.2 LBS | HEART RATE: 76 BPM | DIASTOLIC BLOOD PRESSURE: 84 MMHG

## 2023-11-20 DIAGNOSIS — E66.01 MORBID OBESITY WITH BMI OF 40.0-44.9, ADULT: ICD-10-CM

## 2023-11-20 DIAGNOSIS — E11.9 TYPE 2 DIABETES MELLITUS WITHOUT COMPLICATION, WITHOUT LONG-TERM CURRENT USE OF INSULIN: Primary | ICD-10-CM

## 2023-11-20 DIAGNOSIS — R00.2 PALPITATIONS: ICD-10-CM

## 2023-11-20 DIAGNOSIS — R00.0 TACHYCARDIA: ICD-10-CM

## 2023-11-20 RX ORDER — NADOLOL 40 MG/1
40 TABLET ORAL DAILY
Qty: 90 TABLET | Refills: 4 | Status: SHIPPED | OUTPATIENT
Start: 2023-11-20

## 2023-11-20 RX ORDER — TIRZEPATIDE 7.5 MG/.5ML
INJECTION, SOLUTION SUBCUTANEOUS
COMMUNITY

## 2023-11-20 NOTE — LETTER
November 20, 2023       No Recipients    Patient: Kelsey Taylor   YOB: 1980   Date of Visit: 11/20/2023     Dear RAAD Schultz:       Thank you for referring Kelsey Taylor to me for evaluation. Below are the relevant portions of my assessment and plan of care.    If you have questions, please do not hesitate to call me. I look forward to following Kelsey along with you.         Sincerely,        RAAD Chauhan        CC:   No Recipients    Kierra De León APRN  11/20/23 1225  Sign when Signing Visit  Chief Complaint   Patient presents with   • Follow-up     Pt is here for cardiac follow up.  Pt denies CP, SOB, dizziness or palpitations.  Pt does not take a daily ASA.     • Med Refill     Pt request 90 day refills to be sent to [facility].  Medications were verified by the pt.     • Lab Work     Pt states their last labs were in August with her endocrinologist.         Cardiac Complaints  none      Subjective  Kelsey Taylor is a 43 y.o. female with diabetes who was referred in August 2019 for cardiac evaluation of tachycardia and chest pain. In 2015 she was found to have abnormal thyroid function which was followed by endocrinology until she was diagnosed with Graves disease in 2019 managed with Tapazole. Because of the cardiac symptoms, she underwent stress test and echocardiogram which showed mild hypertensive response, normal chronotropic response and no ischemia. Echo revealed normal LVEF with mild MR. Lopressor was changed to nadolol and symptoms improved.     She comes today for follow up and cardiac concerns are denied. No CP, SOA, palpitations, dizziness, or syncope noted. She does admit that she had thyroidectomy in December of 2022, non cancerous mass. Labs with PCP, checked in August. Refills requested.            Cardiac History  Past Surgical History:   Procedure Laterality Date   • CARDIOVASCULAR STRESS TEST  09/11/2019    7 Min, 10 Secs.8.80 METS. 86% THR.  BP- 150/104. EF 57%. Negative.   • ECHO - CONVERTED  09/11/2019    EF 65%. LA- 4.1 Cm. Mild MR. RVSP- 21 mmHg.   • THYROIDECTOMY  12/2022       Current Outpatient Medications   Medication Sig Dispense Refill   • Insulin Degludec (TRESIBA FLEXTOUCH SC) Inject 64 Units under the skin into the appropriate area as directed Every Night.     • metFORMIN ER (GLUCOPHAGE-XR) 500 MG 24 hr tablet Take 1 tablet by mouth 4 (Four) Times a Day.     • nadolol (CORGARD) 40 MG tablet Take 1 tablet by mouth Daily. 90 tablet 4   • Tirzepatide (Mounjaro) 7.5 MG/0.5ML solution pen-injector Inject  under the skin into the appropriate area as directed.       No current facility-administered medications for this visit.       Patient has no known allergies.    Past Medical History:   Diagnosis Date   • Diabetes mellitus    • Graves disease    • H/O tubal ligation    • History of endometrial ablation    • Hx of cholecystectomy    • Thyroid nodule        Social History     Socioeconomic History   • Marital status:    Tobacco Use   • Smoking status: Never   • Smokeless tobacco: Never   Vaping Use   • Vaping Use: Never used   Substance and Sexual Activity   • Alcohol use: Not Currently     Alcohol/week: 1.0 standard drink of alcohol     Types: 1 Glasses of wine per week     Comment: occasionally    • Drug use: No   • Sexual activity: Defer       Family History   Problem Relation Age of Onset   • Lung cancer Mother    • Heart failure Father    • Arrhythmia Father    • Atrial fibrillation Father    • Heart attack Father    • Hyperlipidemia Father    • Hypertension Father    • Diabetes type II Father    • Diabetes type II Sister    • Cancer Paternal Grandmother        Review of Systems   Constitutional: Negative for malaise/fatigue and night sweats.   Cardiovascular:  Negative for chest pain, claudication, dyspnea on exertion, irregular heartbeat, leg swelling, near-syncope, orthopnea, palpitations and syncope.   Respiratory:  Negative for  "cough, shortness of breath and wheezing.    Musculoskeletal:  Negative for back pain, joint pain and stiffness.   Gastrointestinal:  Negative for anorexia, heartburn, nausea and vomiting.   Genitourinary:  Negative for dysuria, hematuria, hesitancy and nocturia.   Neurological:  Negative for dizziness, headaches and light-headedness.   Psychiatric/Behavioral:  Negative for depression and memory loss. The patient is not nervous/anxious.            Objective    /84 (BP Location: Left arm, Patient Position: Sitting)   Pulse 76   Ht 162.6 cm (64\")   Wt 106 kg (233 lb 3.2 oz)   BMI 40.03 kg/m²     Constitutional:       Appearance: Not in distress.   Eyes:      Pupils: Pupils are equal, round, and reactive to light.   HENT:      Nose: Nose normal.   Pulmonary:      Effort: Pulmonary effort is normal.      Breath sounds: Normal breath sounds.   Cardiovascular:      PMI at left midclavicular line. Normal rate. Regular rhythm.      Murmurs: There is a systolic murmur.   Abdominal:      Palpations: Abdomen is soft.   Musculoskeletal: Normal range of motion.      Cervical back: Normal range of motion and neck supple. Skin:     General: Skin is warm and dry.   Neurological:      Mental Status: Alert.         Procedures         Diagnoses and all orders for this visit:    1. Type 2 diabetes mellitus without complication, without long-term current use of insulin (Primary)    2. Tachycardia  -     nadolol (CORGARD) 40 MG tablet; Take 1 tablet by mouth Daily.  Dispense: 90 tablet; Refill: 4    3. Palpitations  -     nadolol (CORGARD) 40 MG tablet; Take 1 tablet by mouth Daily.  Dispense: 90 tablet; Refill: 4    4. Morbid obesity with BMI of 40.0-44.9, adult             Palpitations: Since palpitations are denied, continue current corgard therapy.  Limited caffeine urged. Adequate water intake urged.     DM: Using tresiba, mounjaro, and glucophage therapy per endocrine, limited carb diet urged . AIC followed by endocrine. "      Angelica:  Followed by endocrine. Thyroidectomy in December, has done well in regards     Cardiac status: Stable. No new concerns are voiced. No repeat workup advised.      Refills per request.     BMI noted at 40.03 good cardiac ADA diet urged. On mounjaro, helping with weight loss.     Yearly follow up recommended.        Problems Addressed this Visit          Cardiac and Vasculature    Tachycardia    Relevant Medications    nadolol (CORGARD) 40 MG tablet    Palpitations    Relevant Medications    nadolol (CORGARD) 40 MG tablet       Endocrine and Metabolic    Type 2 diabetes mellitus without complication, without long-term current use of insulin - Primary    Relevant Medications    Tirzepatide (Mounjaro) 7.5 MG/0.5ML solution pen-injector     Other Visit Diagnoses       Morbid obesity with BMI of 40.0-44.9, adult              Diagnoses         Codes Comments    Type 2 diabetes mellitus without complication, without long-term current use of insulin    -  Primary ICD-10-CM: E11.9  ICD-9-CM: 250.00     Tachycardia     ICD-10-CM: R00.0  ICD-9-CM: 785.0     Palpitations     ICD-10-CM: R00.2  ICD-9-CM: 785.1     Morbid obesity with BMI of 40.0-44.9, adult     ICD-10-CM: E66.01, Z68.41  ICD-9-CM: 278.01, V85.41                         Electronically signed by Kierra De León, RAAD November 20, 2023 12:25 EST

## 2023-11-20 NOTE — PROGRESS NOTES
Chief Complaint   Patient presents with    Follow-up     Pt is here for cardiac follow up.  Pt denies CP, SOB, dizziness or palpitations.  Pt does not take a daily ASA.      Med Refill     Pt request 90 day refills to be sent to [email or web address].  Medications were verified by the pt.      Lab Work     Pt states their last labs were in August with her endocrinologist.         Cardiac Complaints  none      Subjective   Kelsey Taylor is a 43 y.o. female with diabetes who was referred in August 2019 for cardiac evaluation of tachycardia and chest pain. In 2015 she was found to have abnormal thyroid function which was followed by endocrinology until she was diagnosed with Graves disease in 2019 managed with Tapazole. Because of the cardiac symptoms, she underwent stress test and echocardiogram which showed mild hypertensive response, normal chronotropic response and no ischemia. Echo revealed normal LVEF with mild MR. Lopressor was changed to nadolol and symptoms improved.     She comes today for follow up and cardiac concerns are denied. No CP, SOA, palpitations, dizziness, or syncope noted. She does admit that she had thyroidectomy in December of 2022, non cancerous mass. Labs with PCP, checked in August. Refills requested.            Cardiac History  Past Surgical History:   Procedure Laterality Date    CARDIOVASCULAR STRESS TEST  09/11/2019    7 Min, 10 Secs.8.80 METS. 86% THR. BP- 150/104. EF 57%. Negative.    ECHO - CONVERTED  09/11/2019    EF 65%. LA- 4.1 Cm. Mild MR. RVSP- 21 mmHg.    THYROIDECTOMY  12/2022       Current Outpatient Medications   Medication Sig Dispense Refill    Insulin Degludec (TRESIBA FLEXTOUCH SC) Inject 64 Units under the skin into the appropriate area as directed Every Night.      metFORMIN ER (GLUCOPHAGE-XR) 500 MG 24 hr tablet Take 1 tablet by mouth 4 (Four) Times a Day.      nadolol (CORGARD) 40 MG tablet Take 1 tablet by mouth Daily. 90 tablet 4    Tirzepatide (Mounjaro) 7.5 MG/0.5ML  "solution pen-injector Inject  under the skin into the appropriate area as directed.       No current facility-administered medications for this visit.       Patient has no known allergies.    Past Medical History:   Diagnosis Date    Diabetes mellitus     Graves disease     H/O tubal ligation     History of endometrial ablation     Hx of cholecystectomy     Thyroid nodule        Social History     Socioeconomic History    Marital status:    Tobacco Use    Smoking status: Never    Smokeless tobacco: Never   Vaping Use    Vaping Use: Never used   Substance and Sexual Activity    Alcohol use: Not Currently     Alcohol/week: 1.0 standard drink of alcohol     Types: 1 Glasses of wine per week     Comment: occasionally     Drug use: No    Sexual activity: Defer       Family History   Problem Relation Age of Onset    Lung cancer Mother     Heart failure Father     Arrhythmia Father     Atrial fibrillation Father     Heart attack Father     Hyperlipidemia Father     Hypertension Father     Diabetes type II Father     Diabetes type II Sister     Cancer Paternal Grandmother        Review of Systems   Constitutional: Negative for malaise/fatigue and night sweats.   Cardiovascular:  Negative for chest pain, claudication, dyspnea on exertion, irregular heartbeat, leg swelling, near-syncope, orthopnea, palpitations and syncope.   Respiratory:  Negative for cough, shortness of breath and wheezing.    Musculoskeletal:  Negative for back pain, joint pain and stiffness.   Gastrointestinal:  Negative for anorexia, heartburn, nausea and vomiting.   Genitourinary:  Negative for dysuria, hematuria, hesitancy and nocturia.   Neurological:  Negative for dizziness, headaches and light-headedness.   Psychiatric/Behavioral:  Negative for depression and memory loss. The patient is not nervous/anxious.            Objective     /84 (BP Location: Left arm, Patient Position: Sitting)   Pulse 76   Ht 162.6 cm (64\")   Wt 106 kg (233 " lb 3.2 oz)   BMI 40.03 kg/m²     Constitutional:       Appearance: Not in distress.   Eyes:      Pupils: Pupils are equal, round, and reactive to light.   HENT:      Nose: Nose normal.   Pulmonary:      Effort: Pulmonary effort is normal.      Breath sounds: Normal breath sounds.   Cardiovascular:      PMI at left midclavicular line. Normal rate. Regular rhythm.      Murmurs: There is a systolic murmur.   Abdominal:      Palpations: Abdomen is soft.   Musculoskeletal: Normal range of motion.      Cervical back: Normal range of motion and neck supple. Skin:     General: Skin is warm and dry.   Neurological:      Mental Status: Alert.         Procedures         Diagnoses and all orders for this visit:    1. Type 2 diabetes mellitus without complication, without long-term current use of insulin (Primary)    2. Tachycardia  -     nadolol (CORGARD) 40 MG tablet; Take 1 tablet by mouth Daily.  Dispense: 90 tablet; Refill: 4    3. Palpitations  -     nadolol (CORGARD) 40 MG tablet; Take 1 tablet by mouth Daily.  Dispense: 90 tablet; Refill: 4    4. Morbid obesity with BMI of 40.0-44.9, adult             Palpitations: Since palpitations are denied, continue current corgard therapy.  Limited caffeine urged. Adequate water intake urged.     DM: Using tresiba, mounjaro, and glucophage therapy per endocrine, limited carb diet urged . AIC followed by endocrine.      Graves:  Followed by endocrine. Thyroidectomy in December, has done well in regards     Cardiac status: Stable. No new concerns are voiced. No repeat workup advised.      Refills per request.     BMI noted at 40.03 good cardiac ADA diet urged. On mounjaro, helping with weight loss.     Yearly follow up recommended.        Problems Addressed this Visit          Cardiac and Vasculature    Tachycardia    Relevant Medications    nadolol (CORGARD) 40 MG tablet    Palpitations    Relevant Medications    nadolol (CORGARD) 40 MG tablet       Endocrine and Metabolic    Type  2 diabetes mellitus without complication, without long-term current use of insulin - Primary    Relevant Medications    Tirzepatide (Mounjaro) 7.5 MG/0.5ML solution pen-injector     Other Visit Diagnoses       Morbid obesity with BMI of 40.0-44.9, adult              Diagnoses         Codes Comments    Type 2 diabetes mellitus without complication, without long-term current use of insulin    -  Primary ICD-10-CM: E11.9  ICD-9-CM: 250.00     Tachycardia     ICD-10-CM: R00.0  ICD-9-CM: 785.0     Palpitations     ICD-10-CM: R00.2  ICD-9-CM: 785.1     Morbid obesity with BMI of 40.0-44.9, adult     ICD-10-CM: E66.01, Z68.41  ICD-9-CM: 278.01, V85.41                         Electronically signed by Kierra De León, APRN November 20, 2023 12:25 EST

## 2024-11-06 ENCOUNTER — OFFICE VISIT (OUTPATIENT)
Dept: CARDIOLOGY | Facility: CLINIC | Age: 44
End: 2024-11-06
Payer: COMMERCIAL

## 2024-11-06 VITALS
WEIGHT: 184.6 LBS | HEIGHT: 64 IN | BODY MASS INDEX: 31.51 KG/M2 | HEART RATE: 80 BPM | SYSTOLIC BLOOD PRESSURE: 90 MMHG | DIASTOLIC BLOOD PRESSURE: 60 MMHG

## 2024-11-06 DIAGNOSIS — R00.2 PALPITATIONS: Primary | ICD-10-CM

## 2024-11-06 DIAGNOSIS — E05.90 HYPERTHYROIDISM: ICD-10-CM

## 2024-11-06 DIAGNOSIS — E66.811 OBESITY (BMI 30.0-34.9): ICD-10-CM

## 2024-11-06 DIAGNOSIS — R00.0 TACHYCARDIA: ICD-10-CM

## 2024-11-06 PROCEDURE — 99214 OFFICE O/P EST MOD 30 MIN: CPT | Performed by: NURSE PRACTITIONER

## 2024-11-06 RX ORDER — NADOLOL 20 MG/1
20 TABLET ORAL DAILY
Qty: 90 TABLET | Refills: 3 | Status: SHIPPED | OUTPATIENT
Start: 2024-11-06

## 2024-11-06 RX ORDER — LEVOTHYROXINE SODIUM 100 UG/1
100 TABLET ORAL DAILY
COMMUNITY

## 2024-11-06 RX ORDER — TIRZEPATIDE 10 MG/.5ML
INJECTION, SOLUTION SUBCUTANEOUS WEEKLY
COMMUNITY

## 2024-11-06 RX ORDER — MULTIVIT-MIN/IRON/FOLIC ACID/K 18-600-40
CAPSULE ORAL DAILY
COMMUNITY

## 2024-11-06 RX ORDER — ESCITALOPRAM OXALATE 10 MG/1
10 TABLET ORAL DAILY
COMMUNITY

## 2024-11-06 RX ORDER — ERGOCALCIFEROL 1.25 MG/1
50000 CAPSULE, LIQUID FILLED ORAL WEEKLY
COMMUNITY

## 2024-11-06 NOTE — PROGRESS NOTES
Chief Complaint   Patient presents with    Follow-up     Cardiac management    Lab     Endocrinologist monitors labs every 3 months. Also had labs with Crissy Hermosillo, vitamin D low.    Weight loss     Not eating as much since starting on Mounjaro for diabetes.    Med Refill     Needs refills on nadolol-90 day    Fatigue     Has been feeling more tired, decreased energy.         HPI:  HPI   Kelsey Taylor is a 44 y.o. female with diabetes who was referred in August 2019 for cardiac evaluation of tachycardia and chest pain. In 2015 she was found to have abnormal thyroid function which was followed by endocrinology until she was diagnosed with Graves disease in 2019 managed with Tapazole. Because of the cardiac symptoms, she underwent stress test and echocardiogram which showed mild hypertensive response, normal chronotropic response and no ischemia. Echo revealed normal LVEF with mild MR. Lopressor was changed to nadolol and symptoms improved. she had thyroidectomy in December of 2022, non cancerous mass and follows with endocrinology.      She comes today for follow up. Patient denies chest pain, pressure, palpitations, tightness, dizziness, shortness of air. She is feeling fatigued and had labs with PCP about 3 months ago and found low vitamin D and is supplementing. She has had total weight loss of 80 lbs in the last 2 years.     Cardiac History:    Past Surgical History:   Procedure Laterality Date    CARDIOVASCULAR STRESS TEST  09/11/2019    7 Min, 10 Secs.8.80 METS. 86% THR. BP- 150/104. EF 57%. Negative.    ECHO - CONVERTED  09/11/2019    EF 65%. LA- 4.1 Cm. Mild MR. RVSP- 21 mmHg.    THYROIDECTOMY  12/2022       Current Outpatient Medications   Medication Sig Dispense Refill    escitalopram (LEXAPRO) 10 MG tablet Take 1 tablet by mouth Daily.      levothyroxine (SYNTHROID, LEVOTHROID) 100 MCG tablet Take 1 tablet by mouth Daily.      metFORMIN ER (GLUCOPHAGE-XR) 500 MG 24 hr tablet Take 2 tablets by mouth Every  "Night.      nadolol (CORGARD) 20 MG tablet Take 1 tablet by mouth Daily. 90 tablet 3    Tirzepatide (Mounjaro) 10 MG/0.5ML solution auto-injector Inject  under the skin into the appropriate area as directed 1 (One) Time Per Week.      vitamin D (ERGOCALCIFEROL) 1.25 MG (20244 UT) capsule capsule Take 1 capsule by mouth 1 (One) Time Per Week.      Vitamin D, Cholecalciferol, 50 MCG (2000 UT) capsule Take  by mouth Daily.       No current facility-administered medications for this visit.       Patient has no known allergies.    Past Medical History:   Diagnosis Date    Diabetes mellitus     Graves disease     H/O total thyroidectomy     H/O tubal ligation     History of endometrial ablation     Hx of cholecystectomy     Thyroid nodule        Social History     Socioeconomic History    Marital status:    Tobacco Use    Smoking status: Never     Passive exposure: Past    Smokeless tobacco: Never   Vaping Use    Vaping status: Never Used   Substance and Sexual Activity    Alcohol use: Not Currently     Alcohol/week: 1.0 standard drink of alcohol     Types: 1 Glasses of wine per week     Comment: occasionally     Drug use: No    Sexual activity: Defer       Family History   Problem Relation Age of Onset    Lung cancer Mother     Heart failure Father     Arrhythmia Father     Atrial fibrillation Father     Heart attack Father     Hyperlipidemia Father     Hypertension Father     Diabetes type II Father     Diabetes type II Sister     Cancer Paternal Grandmother        Vitals:   BP 90/60 (BP Location: Right arm, Patient Position: Sitting)   Pulse 80   Ht 162.6 cm (64.02\")   Wt 83.7 kg (184 lb 9.6 oz)   BMI 31.67 kg/m²     Physical Exam  Vitals and nursing note reviewed.   Constitutional:       Appearance: She is obese.   Neck:      Vascular: No carotid bruit.   Cardiovascular:      Rate and Rhythm: Normal rate and regular rhythm.      Pulses: Normal pulses.      Heart sounds: Normal heart sounds. No murmur " heard.     No friction rub. No gallop.   Pulmonary:      Effort: Pulmonary effort is normal.      Breath sounds: Normal breath sounds and air entry.   Musculoskeletal:      Right lower leg: No edema.      Left lower leg: No edema.   Skin:     General: Skin is warm and dry.      Capillary Refill: Capillary refill takes less than 2 seconds.   Neurological:      Mental Status: She is alert and oriented to person, place, and time.       Procedures     Assessment & Plan     Diagnoses and all orders for this visit:    1. Palpitations (Primary)  -     nadolol (CORGARD) 20 MG tablet; Take 1 tablet by mouth Daily.  Dispense: 90 tablet; Refill: 3    2. Hyperthyroidism    3. Tachycardia  -     nadolol (CORGARD) 20 MG tablet; Take 1 tablet by mouth Daily.  Dispense: 90 tablet; Refill: 3    4. Obesity (BMI 30.0-34.9)    Palpitations  - Controlled with nadolol, continue    Hypothyroidism/Graves'  -Status post thyroidectomy  - Following with endocrinology, continue    Obesity  - Had an 80 pound weight loss over the last 2 years using Mounjaro  - BMI has decreased from 40-31.7 in the last year, congratulated her!    Stable from a cardiac standpoint. No further testing recommended at this time. Decrease nadolol to 20 mg. Encouraged hydration.      Visit Diagnoses:    ICD-10-CM ICD-9-CM   1. Palpitations  R00.2 785.1   2. Hyperthyroidism  E05.90 242.90   3. Tachycardia  R00.0 785.0   4. Obesity (BMI 30.0-34.9)  E66.811 278.00       Meds Ordered During Visit:  New Medications Ordered This Visit   Medications    nadolol (CORGARD) 20 MG tablet     Sig: Take 1 tablet by mouth Daily.     Dispense:  90 tablet     Refill:  3     This prescription was filled on 3/27/2020. Any refills authorized will be placed on file.       Follow Up Appointment:   Return in about 6 months (around 5/6/2025), or if symptoms worsen or fail to improve.           This document has been electronically signed by RAAD Jack  November 6, 2024 10:28  EST    Dictated Utilizing Dragon Dictation: Part of this note may be an electronic transcription/translation of spoken language to printed text using the Dragon Dictation System.

## 2024-12-27 DIAGNOSIS — R00.0 TACHYCARDIA: ICD-10-CM

## 2024-12-27 DIAGNOSIS — R00.2 PALPITATIONS: ICD-10-CM

## 2024-12-30 RX ORDER — NADOLOL 40 MG/1
40 TABLET ORAL DAILY
Qty: 90 TABLET | Refills: 4 | OUTPATIENT
Start: 2024-12-30

## 2025-05-12 ENCOUNTER — OFFICE VISIT (OUTPATIENT)
Dept: CARDIOLOGY | Facility: CLINIC | Age: 45
End: 2025-05-12
Payer: COMMERCIAL

## 2025-05-12 VITALS
BODY MASS INDEX: 32.1 KG/M2 | WEIGHT: 188 LBS | HEART RATE: 72 BPM | HEIGHT: 64 IN | SYSTOLIC BLOOD PRESSURE: 100 MMHG | DIASTOLIC BLOOD PRESSURE: 62 MMHG

## 2025-05-12 DIAGNOSIS — E05.90 HYPERTHYROIDISM: ICD-10-CM

## 2025-05-12 DIAGNOSIS — E11.9 TYPE 2 DIABETES MELLITUS WITHOUT COMPLICATION, WITHOUT LONG-TERM CURRENT USE OF INSULIN: ICD-10-CM

## 2025-05-12 DIAGNOSIS — R00.2 PALPITATIONS: Primary | ICD-10-CM

## 2025-05-12 DIAGNOSIS — E66.811 OBESITY (BMI 30.0-34.9): ICD-10-CM

## 2025-05-12 DIAGNOSIS — R00.0 TACHYCARDIA: ICD-10-CM

## 2025-05-12 PROCEDURE — 99214 OFFICE O/P EST MOD 30 MIN: CPT | Performed by: NURSE PRACTITIONER

## 2025-05-12 RX ORDER — VILAZODONE HYDROCHLORIDE 10 MG/1
10 TABLET ORAL DAILY
COMMUNITY

## 2025-05-12 RX ORDER — TIRZEPATIDE 7.5 MG/.5ML
INJECTION, SOLUTION SUBCUTANEOUS WEEKLY
COMMUNITY

## 2025-05-12 RX ORDER — NADOLOL 20 MG/1
20 TABLET ORAL DAILY
Qty: 90 TABLET | Refills: 3 | Status: SHIPPED | OUTPATIENT
Start: 2025-05-12

## 2025-05-12 RX ORDER — LEVOTHYROXINE SODIUM 112 UG/1
112 TABLET ORAL
COMMUNITY

## 2025-05-12 NOTE — PROGRESS NOTES
"Chief Complaint   Patient presents with    Follow-up     For cardiac management,denies any cardiac problems \" doing good\"     Med Refill     Refills needed on nadolol. 90 days to [facility]    Labs     Endocrinologist checks labs every 3 months, last checked in March. Was told it all looked good. A1C 6.7. TSH normal.    Aspirin     Pt does not take a daily aspirin.     medication changes     Levothyroxine dose increased, Mounjaro decreased due to side effects, Lexapro changed to Viibryd.         HPI:  HPI   Kelsey Taylor is a 44 y.o. female with diabetes who was referred in August 2019 for cardiac evaluation of tachycardia and chest pain. In 2015 she was found to have abnormal thyroid function which was followed by endocrinology until she was diagnosed with Graves disease in 2019 managed with Tapazole. Because of the cardiac symptoms, she underwent stress test and echocardiogram which showed mild hypertensive response, normal chronotropic response and no ischemia. Echo revealed normal LVEF with mild MR. Lopressor was changed to nadolol and symptoms improved. she had thyroidectomy in December of 2022, non cancerous mass and follows with endocrinology.      Turns today for a follow-up visit. Patient denies chest pain, pressure, palpitations, tightness, dizziness, shortness of air. She just returned from a \"[place]\" trip to [place] with 17 yo daughter who mhad a kidney transplant. Labs with PCP.  She reports she is doing well.    Cardiac History:    Past Surgical History:   Procedure Laterality Date    CARDIOVASCULAR STRESS TEST  09/11/2019    7 Min, 10 Secs.8.80 METS. 86% THR. BP- 150/104. EF 57%. Negative.    ECHO - CONVERTED  09/11/2019    EF 65%. LA- 4.1 Cm. Mild MR. RVSP- 21 mmHg.    THYROIDECTOMY  12/2022       Current Outpatient Medications   Medication Sig Dispense Refill    levothyroxine (SYNTHROID, LEVOTHROID) 112 MCG tablet Take 1 tablet by mouth Every Morning.      metFORMIN ER (GLUCOPHAGE-XR) " "500 MG 24 hr tablet Take 2 tablets by mouth Every Night.      nadolol (CORGARD) 20 MG tablet Take 1 tablet by mouth Daily. 90 tablet 3    Tirzepatide (Mounjaro) 7.5 MG/0.5ML solution auto-injector Inject  under the skin into the appropriate area as directed 1 (One) Time Per Week.      vilazodone (VIIBRYD) 10 MG tablet tablet Take 1 tablet by mouth Daily.      vitamin D (ERGOCALCIFEROL) 1.25 MG (00199 UT) capsule capsule Take 1 capsule by mouth 1 (One) Time Per Week.      Vitamin D, Cholecalciferol, 50 MCG (2000 UT) capsule Take  by mouth Daily.       No current facility-administered medications for this visit.       Patient has no known allergies.    Past Medical History:   Diagnosis Date    Diabetes mellitus     Graves disease     H/O total thyroidectomy     H/O tubal ligation     History of endometrial ablation     Hx of cholecystectomy     Thyroid nodule        Social History     Socioeconomic History    Marital status:    Tobacco Use    Smoking status: Never     Passive exposure: Past    Smokeless tobacco: Never   Vaping Use    Vaping status: Never Used   Substance and Sexual Activity    Alcohol use: Not Currently     Alcohol/week: 1.0 standard drink of alcohol     Types: 1 Glasses of wine per week     Comment: occasionally     Drug use: No    Sexual activity: Defer       Family History   Problem Relation Age of Onset    Lung cancer Mother     Heart failure Father     Arrhythmia Father     Atrial fibrillation Father     Heart attack Father     Hyperlipidemia Father     Hypertension Father     Diabetes type II Father     Diabetes type II Sister     Cancer Paternal Grandmother        Vitals:   /62   Pulse 72   Ht 162.6 cm (64\")   Wt 85.3 kg (188 lb)   BMI 32.27 kg/m²     Physical Exam  Vitals and nursing note reviewed.   Constitutional:       Appearance: She is obese.   Neck:      Vascular: No carotid bruit.   Cardiovascular:      Rate and Rhythm: Normal rate and regular rhythm.      Pulses: " Normal pulses.      Heart sounds: Normal heart sounds. No murmur heard.     No friction rub. No gallop.   Pulmonary:      Effort: Pulmonary effort is normal.      Breath sounds: Normal breath sounds and air entry.   Musculoskeletal:      Right lower leg: No edema.      Left lower leg: No edema.   Skin:     General: Skin is warm and dry.      Capillary Refill: Capillary refill takes less than 2 seconds.   Neurological:      Mental Status: She is alert and oriented to person, place, and time.       Procedures     Assessment & Plan     Diagnoses and all orders for this visit:    1. Palpitations (Primary)  -     nadolol (CORGARD) 20 MG tablet; Take 1 tablet by mouth Daily.  Dispense: 90 tablet; Refill: 3    2. Hyperthyroidism    3. Obesity (BMI 30.0-34.9)    4. Type 2 diabetes mellitus without complication, without long-term current use of insulin    5. Tachycardia  -     nadolol (CORGARD) 20 MG tablet; Take 1 tablet by mouth Daily.  Dispense: 90 tablet; Refill: 3    Palpitations/tachycardia  - Controlled with nadolol, continue    Hyperthyroidism  - S/p thyroidectomy  - Following with endocrinology    Obesity  - She has had about an 80 pound weight loss in the last couple years  - Currently taking Mounjaro    Stable from a cardiac standpoint. No further testing recommended at this time. No medication changes made today. No refills needed.      Visit Diagnoses:    ICD-10-CM ICD-9-CM   1. Palpitations  R00.2 785.1   2. Hyperthyroidism  E05.90 242.90   3. Obesity (BMI 30.0-34.9)  E66.811 278.00   4. Type 2 diabetes mellitus without complication, without long-term current use of insulin  E11.9 250.00   5. Tachycardia  R00.0 785.0       Meds Ordered During Visit:  New Medications Ordered This Visit   Medications    nadolol (CORGARD) 20 MG tablet     Sig: Take 1 tablet by mouth Daily.     Dispense:  90 tablet     Refill:  3     This prescription was filled on 3/27/2020. Any refills authorized will be placed on file.        Follow Up Appointment:   Return in about 1 year (around 5/12/2026), or if symptoms worsen or fail to improve.           This document has been electronically signed by RAAD Jack  May 12, 2025 09:11 EDT    Dictated Utilizing Dragon Dictation: Part of this note may be an electronic transcription/translation of spoken language to printed text using the Dragon Dictation System.